# Patient Record
Sex: FEMALE | Race: WHITE | NOT HISPANIC OR LATINO | Employment: FULL TIME | URBAN - METROPOLITAN AREA
[De-identification: names, ages, dates, MRNs, and addresses within clinical notes are randomized per-mention and may not be internally consistent; named-entity substitution may affect disease eponyms.]

---

## 2021-03-09 PROCEDURE — 87086 URINE CULTURE/COLONY COUNT: CPT | Performed by: NURSE PRACTITIONER

## 2022-09-06 PROCEDURE — U0004 COV-19 TEST NON-CDC HGH THRU: HCPCS | Performed by: NURSE PRACTITIONER

## 2025-07-05 ENCOUNTER — HOSPITAL ENCOUNTER (EMERGENCY)
Facility: HOSPITAL | Age: 30
Discharge: HOME | End: 2025-07-05
Attending: STUDENT IN AN ORGANIZED HEALTH CARE EDUCATION/TRAINING PROGRAM
Payer: MEDICAID

## 2025-07-05 VITALS
TEMPERATURE: 98.6 F | HEIGHT: 62 IN | SYSTOLIC BLOOD PRESSURE: 116 MMHG | RESPIRATION RATE: 20 BRPM | DIASTOLIC BLOOD PRESSURE: 89 MMHG | BODY MASS INDEX: 49.5 KG/M2 | OXYGEN SATURATION: 100 % | WEIGHT: 269 LBS | HEART RATE: 104 BPM

## 2025-07-05 DIAGNOSIS — T78.1XXA ALLERGIC REACTION TO PEANUT: Primary | ICD-10-CM

## 2025-07-05 PROCEDURE — 99282 EMERGENCY DEPT VISIT SF MDM: CPT | Performed by: STUDENT IN AN ORGANIZED HEALTH CARE EDUCATION/TRAINING PROGRAM

## 2025-07-05 PROCEDURE — 96361 HYDRATE IV INFUSION ADD-ON: CPT

## 2025-07-05 PROCEDURE — 25010000002 ONDANSETRON PER 1 MG

## 2025-07-05 PROCEDURE — 96375 TX/PRO/DX INJ NEW DRUG ADDON: CPT

## 2025-07-05 PROCEDURE — 25010000002 FAMOTIDINE 10 MG/ML SOLUTION

## 2025-07-05 PROCEDURE — 96374 THER/PROPH/DIAG INJ IV PUSH: CPT

## 2025-07-05 PROCEDURE — 25810000003 SODIUM CHLORIDE 0.9 % SOLUTION

## 2025-07-05 RX ORDER — METHYLPREDNISOLONE SODIUM SUCCINATE 125 MG/2ML
125 INJECTION INTRAMUSCULAR; INTRAVENOUS ONCE
Status: DISCONTINUED | OUTPATIENT
Start: 2025-07-05 | End: 2025-07-05

## 2025-07-05 RX ORDER — FAMOTIDINE 10 MG/ML
20 INJECTION, SOLUTION INTRAVENOUS ONCE
Status: COMPLETED | OUTPATIENT
Start: 2025-07-05 | End: 2025-07-05

## 2025-07-05 RX ORDER — ONDANSETRON 2 MG/ML
4 INJECTION INTRAMUSCULAR; INTRAVENOUS ONCE
Status: COMPLETED | OUTPATIENT
Start: 2025-07-05 | End: 2025-07-05

## 2025-07-05 RX ADMIN — ONDANSETRON 4 MG: 2 INJECTION INTRAMUSCULAR; INTRAVENOUS at 19:56

## 2025-07-05 RX ADMIN — SODIUM CHLORIDE 1000 ML: 9 INJECTION, SOLUTION INTRAVENOUS at 19:51

## 2025-07-05 RX ADMIN — FAMOTIDINE 20 MG: 10 INJECTION, SOLUTION INTRAVENOUS at 19:51

## 2025-07-05 NOTE — FSED PROVIDER NOTE
[facility]    EMERGENCY DEPARTMENT ENCOUNTER      Pt Name: Maria Ines Calhoun  MRN: [id number]  YOB: 1995  Date of evaluation: 7/5/2025  Provider: Amanda Xiao PA-C    CHIEF COMPLAINT       Chief Complaint   Patient presents with    Allergic Reaction       HISTORY OF PRESENT ILLNESS  (Location/Symptom, Timing/Onset, Context/Setting, Quality, Duration, Modifying Factors, Severity.)   Maria Ines Calhoun is a 29 y.o. female who presents to the emergency department after having allergic reaction from eating peanuts.  Patient states that she was eating a taco that was not labeled to have peanuts in it when she began itching on her chest and her wife noticed red splotches appearing around her neck.  Patient states that she then began feeling short of breath that was becoming more difficult to breathe.  Patient and patient's wife then went to urgent treatment center where epinephrine was administered and she was sent to emergency department for further evaluation.  Patient states that she has had reactions in the past but never anaphylaxis this severe.  Patient states that she usually gets red hives that appear but has never had the shortness of breath happen.  Patient has never had epinephrine administered before today's event.  Patient denies vomiting, diarrhea, numbness, tingling, loss of consciousness.  Patient states that she is nauseous and feels very jittery in emergency department but that her other symptoms have improved extremely.    HPI   Nursing notes were reviewed.  REVIEW OF SYSTEMS    (2-9 systems for level 4, 10 or more for level 5)   Review of Systems   Constitutional: Negative.    HENT: Negative.     Eyes: Negative.    Respiratory:  Positive for shortness of breath.    Gastrointestinal: Negative.    Endocrine: Negative.    Genitourinary: Negative.    Musculoskeletal: Negative.    Skin: Negative.    Allergic/Immunologic: Positive for food allergies.   Neurological: Negative.    Hematological:  Negative.    Psychiatric/Behavioral: Negative.          All systems reviewed and negative except for those discussed in HPI.   PAST MEDICAL HISTORY     Past Medical History:   Diagnosis Date    Asthma     Endometriosis     Hydrocephalus     PCOS (polycystic ovarian syndrome)        SURGICAL HISTORY       Past Surgical History:   Procedure Laterality Date    BRAIN SURGERY      CSF SHUNT         CURRENT MEDICATIONS     No current facility-administered medications for this encounter.    Current Outpatient Medications:     cetirizine (zyrTEC) 10 MG tablet, Take 10 mg by mouth Daily., Disp: , Rfl:     dexmethylphenidate (FOCALIN) 10 MG tablet, Take 10 mg by mouth As Needed., Disp: , Rfl:     dexmethylphenidate (Focalin) 10 MG tablet, Take 2 tablets by mouth Every Afternoon., Disp: 60 tablet, Rfl: 0    dexmethylphenidate (Focalin) 10 MG tablet, Take 2 tablets by mouth Every Afternoon., Disp: 60 tablet, Rfl: 0    dexmethylphenidate XR (FOCALIN XR) 30 MG 24 hr capsule, Take 30 mg by mouth Daily, Disp: , Rfl:     dexmethylphenidate XR (Focalin XR) 30 MG 24 hr capsule, Take 1 capsule by mouth once Daily, Disp: 30 capsule, Rfl: 0    dexmethylphenidate XR (Focalin XR) 30 MG 24 hr capsule, Take 1 capsule by mouth once Daily, Disp: 30 capsule, Rfl: 0    ketorolac (TORADOL) 10 MG tablet, Take 1 tablet by mouth Every 6 (Six) Hours As Needed for Moderate Pain., Disp: 20 tablet, Rfl: 0    ALLERGIES     Peanut oil; Antihistamines, diphenhydramine-type; Tetracyclines & related; Methylprednisolone; and Other    FAMILY HISTORY     History reviewed. No pertinent family history.     SOCIAL HISTORY       Social History     Socioeconomic History    Marital status: Single   Tobacco Use    Smoking status: Former    Smokeless tobacco: Never   Vaping Use    Vaping status: Never Used   Substance and Sexual Activity    Alcohol use: Never    Drug use: Defer    Sexual activity: Defer       PHYSICAL EXAM    (up to 7 for level 4, 8 or more for level  5)   Physical Exam  Constitutional:       Appearance: Normal appearance.   HENT:      Head: Normocephalic and atraumatic.      Nose: Nose normal.      Mouth/Throat:      Lips: Pink.      Mouth: Mucous membranes are moist. No angioedema.      Pharynx: Oropharynx is clear. Uvula midline. No pharyngeal swelling.   Eyes:      Pupils: Pupils are equal, round, and reactive to light.   Cardiovascular:      Rate and Rhythm: Regular rhythm. Tachycardia present.   Pulmonary:      Effort: Pulmonary effort is normal.      Breath sounds: Normal breath sounds.   Abdominal:      General: Abdomen is flat. Bowel sounds are normal.      Palpations: Abdomen is soft.   Musculoskeletal:         General: Normal range of motion.      Cervical back: Normal range of motion and neck supple. No edema or erythema.   Skin:     General: Skin is warm.   Neurological:      General: No focal deficit present.      Mental Status: She is alert.   Psychiatric:         Mood and Affect: Mood normal.         Behavior: Behavior normal.         Thought Content: Thought content normal.         Judgment: Judgment normal.          DIAGNOSTIC RESULTS     EKG: All EKGs are interpreted by the Emergency Department Physician who either signs or Co-signs this chart in the absence of a cardiologist.    Telemetry Scan   Final Result      Telemetry Scan   Final Result          RADIOLOGY:   Non-plain film images such as CT, Ultrasound and MRI are read by the radiologist. Plain radiographic images are visualized and preliminarily interpreted by the emergency physician with the below findings:    [] Radiologist's Report Reviewed:  No orders to display       ED BEDSIDE ULTRASOUND:   Performed by ED Physician - none    LABS:    I have reviewed and interpreted all of the currently available lab results from this visit (if applicable):  Results for orders placed or performed during the hospital encounter of 09/06/22   COVID-19 PCR, [facility], NP SWAB IN [facility] VIRAL TRANSPORT  MEDIA/ORAL SWISH 24-30 HR TAT - Swab, Nasopharynx    Collection Time: 09/06/22  5:14 PM    Specimen: Nasopharynx; Swab   Result Value Ref Range    SARS-CoV-2 MILLICENT Not Detected Not Detected   POC Rapid Strep A    Collection Time: 09/06/22  5:30 PM    Specimen: Swab   Result Value Ref Range    Rapid Strep A Screen Negative Negative, VALID, INVALID, Not Performed    Internal Control Passed Passed    Lot Number [id number]     Expiration Date 12,976,197         All other labs were within normal range or not returned as of this dictation.    EMERGENCY DEPARTMENT COURSE and DIFFERENTIAL DIAGNOSIS/MDM:   Vitals:    Vitals:    07/05/25 2000 07/05/25 2007 07/05/25 2030 07/05/25 2100   BP:    116/89   BP Location:       Patient Position:       Pulse: 110 113 112 104   Resp:       Temp:       TempSrc:       SpO2: 100%  100% 100%   Weight:       Height:                DIFFERENTIAL DIAGNOSIS    Differential diagnosis considered in the evaluation and treatment of patient include but not limited to: Allergic reaction, anaphylaxis, food allergy    MDM  Patient presents to the emergency department with allergic reaction.  Differential diagnoses as listed above.  Patient has allergic reaction was initially seen at urgent treatment center.  Was given epinephrine at [facility] which helped with symptoms.  Patient sent to emergency department for further evaluation and observation.  Initial examination patient is tachycardic, but states that her breathing has improved severely and rash on physical examination is only underneath her chin.  I discussed other medications that would be given to the patient including Benadryl, Solu-Medrol, Pepcid, and fluids.  Patient voiced understanding of medications that would be administered but refused Benadryl and Solu-Medrol saying that IV Benadryl makes her feel worse than it does help with improvement and that Solu-Medrol causes her to break out in a full-body rash, which occurred when she was a child.   Patient stated that she was okay with taking Pepcid and Zofran as well as fluids.  I spoke with Dr. Mena about this patient who agreed with watching her for two hours to watch symptom relief.  Patient reevaluated after medication and fluids ended, patient stated she felt much better.  Patient given an EpiPen prescription by [facility] treatment [facility] that she is getting filled at her pharmacy currently. I encouraged the patient to return to the emergency department immediately for ANY concerns, worsening, new complaints, or if symptoms persist and unable to seek follow-up in a timely fashion.  The patient/family expressed understanding and agreement with this plan.  The patient will follow-up with primary care provider for reevaluation.     MEDICATIONS ADMINISTERED IN ED:  Medications   famotidine (PEPCID) injection 20 mg (20 mg Intravenous Given 7/5/25 1951)   ondansetron (ZOFRAN) injection 4 mg (4 mg Intravenous Given 7/5/25 1956)   sodium chloride 0.9 % bolus 1,000 mL (0 mL Intravenous Stopped 7/5/25 2110)       PROCEDURES:  Procedures          CRITICAL CARE TIME    Total Critical Care time was 0 minutes, excluding separately reportable procedures.   There was a high probability of clinically significant/life threatening deterioration in the patient's condition which required my urgent intervention.    FINAL IMPRESSION      1. Allergic reaction to peanut          DISPOSITION/PLAN     ED Disposition       ED Disposition   Discharge    Condition   Stable    Comment   --               PATIENT REFERRED TO:  Provider, No Known  [facility] [id number]    Schedule an appointment as soon as possible for a visit         DISCHARGE MEDICATIONS:     Medication List        CONTINUE taking these medications      cetirizine 10 MG tablet  Commonly known as: zyrTEC     * dexmethylphenidate XR 30 MG 24 hr capsule  Commonly known as: FOCALIN XR     * dexmethylphenidate 10 MG tablet  Commonly known as: FOCALIN     *  dexmethylphenidate 10 MG tablet  Commonly known as: Focalin  Take 2 tablets by mouth Every Afternoon.     * dexmethylphenidate XR 30 MG 24 hr capsule  Commonly known as: Focalin XR  Take 1 capsule by mouth once Daily     * dexmethylphenidate 10 MG tablet  Commonly known as: Focalin  Take 2 tablets by mouth Every Afternoon.     * dexmethylphenidate XR 30 MG 24 hr capsule  Commonly known as: Focalin XR  Take 1 capsule by mouth once Daily     ketorolac 10 MG tablet  Commonly known as: TORADOL  Take 1 tablet by mouth Every 6 (Six) Hours As Needed for Moderate Pain.           * This list has 6 medication(s) that are the same as other medications prescribed for you. Read the directions carefully, and ask your doctor or other care provider to review them with you.                  Comment: Please note this report has been produced using speech recognition software.      Amanda Xiao PA-C